# Patient Record
Sex: MALE | Race: WHITE | NOT HISPANIC OR LATINO | URBAN - METROPOLITAN AREA
[De-identification: names, ages, dates, MRNs, and addresses within clinical notes are randomized per-mention and may not be internally consistent; named-entity substitution may affect disease eponyms.]

---

## 2020-05-29 ENCOUNTER — OFFICE VISIT (OUTPATIENT)
Dept: URBAN - METROPOLITAN AREA CLINIC 76 | Facility: CLINIC | Age: 75
End: 2020-05-29
Payer: MEDICARE

## 2020-05-29 DIAGNOSIS — H52.4 PRESBYOPIA: ICD-10-CM

## 2020-05-29 DIAGNOSIS — H25.13 AGE-RELATED NUCLEAR CATARACT, BILATERAL: Primary | Chronic | ICD-10-CM

## 2020-05-29 PROCEDURE — 92014 COMPRE OPH EXAM EST PT 1/>: CPT | Performed by: OPTOMETRIST

## 2020-05-29 ASSESSMENT — KERATOMETRY
OS: 43.75
OD: 43.63

## 2020-05-29 ASSESSMENT — INTRAOCULAR PRESSURE
OS: 8
OD: 8

## 2020-05-29 ASSESSMENT — VISUAL ACUITY
OS: 20/30
OD: 20/40

## 2020-05-29 NOTE — IMPRESSION/PLAN
Impression: Age-related nuclear cataract, bilateral: H25.13. Bilateral. Vision: vision affected. Plan: Cataracts account for the patient's complaints. Patient understands changing glasses will not improve vision. Recommend carla rodrigues/ Dr. Katie Dimas for cataract surgery.

## 2020-07-17 ENCOUNTER — HOSPITAL ENCOUNTER (EMERGENCY)
Dept: HOSPITAL 41 - JD.ED | Age: 75
Discharge: HOME | End: 2020-07-17
Payer: MEDICARE

## 2020-07-17 DIAGNOSIS — F32.9: ICD-10-CM

## 2020-07-17 DIAGNOSIS — Z86.73: ICD-10-CM

## 2020-07-17 DIAGNOSIS — Z87.891: ICD-10-CM

## 2020-07-17 DIAGNOSIS — I25.2: ICD-10-CM

## 2020-07-17 DIAGNOSIS — R56.9: ICD-10-CM

## 2020-07-17 DIAGNOSIS — F03.90: ICD-10-CM

## 2020-07-17 DIAGNOSIS — M54.9: ICD-10-CM

## 2020-07-17 DIAGNOSIS — I10: ICD-10-CM

## 2020-07-17 DIAGNOSIS — Z88.0: ICD-10-CM

## 2020-07-17 DIAGNOSIS — E78.00: ICD-10-CM

## 2020-07-17 DIAGNOSIS — G89.29: ICD-10-CM

## 2020-07-17 DIAGNOSIS — I82.621: Primary | ICD-10-CM

## 2020-07-17 PROCEDURE — 99284 EMERGENCY DEPT VISIT MOD MDM: CPT

## 2020-07-17 PROCEDURE — 80053 COMPREHEN METABOLIC PANEL: CPT

## 2020-07-17 PROCEDURE — 93971 EXTREMITY STUDY: CPT

## 2020-07-17 PROCEDURE — 36415 COLL VENOUS BLD VENIPUNCTURE: CPT

## 2020-07-17 PROCEDURE — 85025 COMPLETE CBC W/AUTO DIFF WBC: CPT

## 2020-08-26 ENCOUNTER — HOSPITAL ENCOUNTER (EMERGENCY)
Dept: HOSPITAL 41 - JD.ED | Age: 75
Discharge: HOME | End: 2020-08-26
Payer: MEDICARE

## 2020-08-26 DIAGNOSIS — Z86.73: ICD-10-CM

## 2020-08-26 DIAGNOSIS — I25.2: ICD-10-CM

## 2020-08-26 DIAGNOSIS — F32.9: ICD-10-CM

## 2020-08-26 DIAGNOSIS — I10: ICD-10-CM

## 2020-08-26 DIAGNOSIS — R51: Primary | ICD-10-CM

## 2020-08-26 DIAGNOSIS — Z79.01: ICD-10-CM

## 2020-08-26 DIAGNOSIS — Z88.0: ICD-10-CM

## 2020-08-26 DIAGNOSIS — Z20.828: ICD-10-CM

## 2020-08-26 DIAGNOSIS — Z87.891: ICD-10-CM

## 2020-08-26 DIAGNOSIS — F03.90: ICD-10-CM

## 2020-08-26 DIAGNOSIS — E78.00: ICD-10-CM

## 2020-08-26 DIAGNOSIS — Z79.899: ICD-10-CM

## 2020-08-26 PROCEDURE — 99284 EMERGENCY DEPT VISIT MOD MDM: CPT

## 2020-08-26 PROCEDURE — 36415 COLL VENOUS BLD VENIPUNCTURE: CPT

## 2020-08-26 PROCEDURE — 80053 COMPREHEN METABOLIC PANEL: CPT

## 2020-08-26 PROCEDURE — U0002 COVID-19 LAB TEST NON-CDC: HCPCS

## 2020-08-26 PROCEDURE — 85025 COMPLETE CBC W/AUTO DIFF WBC: CPT

## 2020-08-26 PROCEDURE — 86140 C-REACTIVE PROTEIN: CPT

## 2020-08-26 PROCEDURE — 70450 CT HEAD/BRAIN W/O DYE: CPT

## 2020-10-07 ENCOUNTER — HOSPITAL ENCOUNTER (EMERGENCY)
Dept: HOSPITAL 41 - JD.ED | Age: 75
Discharge: HOME | End: 2020-10-07
Payer: MEDICARE

## 2020-10-07 DIAGNOSIS — Z79.01: ICD-10-CM

## 2020-10-07 DIAGNOSIS — E78.00: ICD-10-CM

## 2020-10-07 DIAGNOSIS — F32.9: ICD-10-CM

## 2020-10-07 DIAGNOSIS — N40.0: ICD-10-CM

## 2020-10-07 DIAGNOSIS — I25.2: ICD-10-CM

## 2020-10-07 DIAGNOSIS — F02.80: ICD-10-CM

## 2020-10-07 DIAGNOSIS — I10: ICD-10-CM

## 2020-10-07 DIAGNOSIS — Z79.899: ICD-10-CM

## 2020-10-07 DIAGNOSIS — G40.909: Primary | ICD-10-CM

## 2020-10-07 DIAGNOSIS — Z86.73: ICD-10-CM

## 2020-10-07 DIAGNOSIS — G30.9: ICD-10-CM

## 2020-10-07 DIAGNOSIS — Z88.0: ICD-10-CM

## 2020-10-07 PROCEDURE — 83880 ASSAY OF NATRIURETIC PEPTIDE: CPT

## 2020-10-07 PROCEDURE — 93005 ELECTROCARDIOGRAM TRACING: CPT

## 2020-10-07 PROCEDURE — 85025 COMPLETE CBC W/AUTO DIFF WBC: CPT

## 2020-10-07 PROCEDURE — 83605 ASSAY OF LACTIC ACID: CPT

## 2020-10-07 PROCEDURE — 96365 THER/PROPH/DIAG IV INF INIT: CPT

## 2020-10-07 PROCEDURE — 80053 COMPREHEN METABOLIC PANEL: CPT

## 2020-10-07 PROCEDURE — 85610 PROTHROMBIN TIME: CPT

## 2020-10-07 PROCEDURE — 80307 DRUG TEST PRSMV CHEM ANLYZR: CPT

## 2020-10-07 PROCEDURE — 83735 ASSAY OF MAGNESIUM: CPT

## 2020-10-07 PROCEDURE — 84484 ASSAY OF TROPONIN QUANT: CPT

## 2020-10-07 PROCEDURE — 85730 THROMBOPLASTIN TIME PARTIAL: CPT

## 2020-10-07 PROCEDURE — 86140 C-REACTIVE PROTEIN: CPT

## 2020-10-07 PROCEDURE — 36415 COLL VENOUS BLD VENIPUNCTURE: CPT

## 2020-10-07 PROCEDURE — 99285 EMERGENCY DEPT VISIT HI MDM: CPT

## 2020-10-07 PROCEDURE — 70450 CT HEAD/BRAIN W/O DYE: CPT

## 2021-03-07 ENCOUNTER — HOSPITAL ENCOUNTER (EMERGENCY)
Dept: HOSPITAL 41 - JD.ED | Age: 76
Discharge: HOME | End: 2021-03-07
Payer: MEDICARE

## 2021-03-07 DIAGNOSIS — F02.80: ICD-10-CM

## 2021-03-07 DIAGNOSIS — Z79.899: ICD-10-CM

## 2021-03-07 DIAGNOSIS — R56.9: ICD-10-CM

## 2021-03-07 DIAGNOSIS — Z88.0: ICD-10-CM

## 2021-03-07 DIAGNOSIS — N40.0: ICD-10-CM

## 2021-03-07 DIAGNOSIS — Z72.0: ICD-10-CM

## 2021-03-07 DIAGNOSIS — G30.9: ICD-10-CM

## 2021-03-07 DIAGNOSIS — M70.22: Primary | ICD-10-CM

## 2021-03-07 DIAGNOSIS — E78.00: ICD-10-CM

## 2021-03-07 DIAGNOSIS — I25.2: ICD-10-CM

## 2021-03-07 DIAGNOSIS — Z86.73: ICD-10-CM

## 2021-03-07 DIAGNOSIS — Z79.01: ICD-10-CM

## 2021-03-07 DIAGNOSIS — I10: ICD-10-CM

## 2021-03-07 PROCEDURE — 99283 EMERGENCY DEPT VISIT LOW MDM: CPT

## 2021-03-07 PROCEDURE — 20605 DRAIN/INJ JOINT/BURSA W/O US: CPT

## 2021-07-18 ENCOUNTER — HOSPITAL ENCOUNTER (EMERGENCY)
Dept: HOSPITAL 41 - JD.ED | Age: 76
Discharge: HOME | End: 2021-07-18
Payer: MEDICARE

## 2021-07-18 DIAGNOSIS — I25.2: ICD-10-CM

## 2021-07-18 DIAGNOSIS — Z87.891: ICD-10-CM

## 2021-07-18 DIAGNOSIS — E78.00: ICD-10-CM

## 2021-07-18 DIAGNOSIS — Z79.899: ICD-10-CM

## 2021-07-18 DIAGNOSIS — I45.10: ICD-10-CM

## 2021-07-18 DIAGNOSIS — N40.0: ICD-10-CM

## 2021-07-18 DIAGNOSIS — R07.89: Primary | ICD-10-CM

## 2021-07-18 DIAGNOSIS — Z88.0: ICD-10-CM

## 2021-07-18 DIAGNOSIS — G30.9: ICD-10-CM

## 2021-07-18 DIAGNOSIS — F02.80: ICD-10-CM

## 2021-07-18 DIAGNOSIS — Z79.01: ICD-10-CM

## 2021-07-18 DIAGNOSIS — I10: ICD-10-CM

## 2021-07-18 PROCEDURE — 93005 ELECTROCARDIOGRAM TRACING: CPT

## 2021-07-18 PROCEDURE — 71045 X-RAY EXAM CHEST 1 VIEW: CPT

## 2021-07-18 PROCEDURE — 84484 ASSAY OF TROPONIN QUANT: CPT

## 2021-07-18 PROCEDURE — 80053 COMPREHEN METABOLIC PANEL: CPT

## 2021-07-18 PROCEDURE — 85025 COMPLETE CBC W/AUTO DIFF WBC: CPT

## 2021-07-18 PROCEDURE — 36415 COLL VENOUS BLD VENIPUNCTURE: CPT

## 2021-07-18 PROCEDURE — 99285 EMERGENCY DEPT VISIT HI MDM: CPT

## 2021-07-18 PROCEDURE — 85379 FIBRIN DEGRADATION QUANT: CPT

## 2021-08-16 ENCOUNTER — HOSPITAL ENCOUNTER (EMERGENCY)
Dept: HOSPITAL 41 - JD.ED | Age: 76
Discharge: HOME | End: 2021-08-16
Payer: MEDICARE

## 2021-08-16 DIAGNOSIS — Z79.01: ICD-10-CM

## 2021-08-16 DIAGNOSIS — M70.22: Primary | ICD-10-CM

## 2021-08-16 DIAGNOSIS — E78.00: ICD-10-CM

## 2021-08-16 DIAGNOSIS — Z79.899: ICD-10-CM

## 2021-08-16 DIAGNOSIS — I10: ICD-10-CM

## 2021-08-16 DIAGNOSIS — L08.9: ICD-10-CM

## 2021-08-16 DIAGNOSIS — I25.2: ICD-10-CM

## 2021-08-16 DIAGNOSIS — E66.9: ICD-10-CM

## 2021-08-16 DIAGNOSIS — Z86.73: ICD-10-CM

## 2021-08-16 PROCEDURE — 99283 EMERGENCY DEPT VISIT LOW MDM: CPT

## 2021-08-16 PROCEDURE — 20605 DRAIN/INJ JOINT/BURSA W/O US: CPT

## 2021-10-20 ENCOUNTER — HOSPITAL ENCOUNTER (EMERGENCY)
Dept: HOSPITAL 41 - JD.ED | Age: 76
Discharge: HOME | End: 2021-10-20
Payer: MEDICARE

## 2021-10-20 DIAGNOSIS — Z88.0: ICD-10-CM

## 2021-10-20 DIAGNOSIS — E78.00: ICD-10-CM

## 2021-10-20 DIAGNOSIS — E66.9: ICD-10-CM

## 2021-10-20 DIAGNOSIS — I25.2: ICD-10-CM

## 2021-10-20 DIAGNOSIS — Z79.899: ICD-10-CM

## 2021-10-20 DIAGNOSIS — G30.9: ICD-10-CM
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
## 2021-10-20 DIAGNOSIS — I10: ICD-10-CM

## 2021-10-20 DIAGNOSIS — R55: Primary | ICD-10-CM

## 2021-10-20 DIAGNOSIS — N40.0: ICD-10-CM

## 2021-10-20 DIAGNOSIS — Z79.01: ICD-10-CM

## 2021-10-20 DIAGNOSIS — F02.80: ICD-10-CM

## 2022-10-11 ENCOUNTER — HOSPITAL ENCOUNTER (EMERGENCY)
Dept: HOSPITAL 41 - JD.ED | Age: 77
Discharge: HOME | End: 2022-10-11
Payer: MEDICARE

## 2022-10-11 DIAGNOSIS — I10: ICD-10-CM

## 2022-10-11 DIAGNOSIS — Z88.0: ICD-10-CM

## 2022-10-11 DIAGNOSIS — Z20.822: ICD-10-CM

## 2022-10-11 DIAGNOSIS — E78.00: ICD-10-CM

## 2022-10-11 DIAGNOSIS — E66.9: ICD-10-CM

## 2022-10-11 DIAGNOSIS — R56.9: Primary | ICD-10-CM

## 2022-10-11 DIAGNOSIS — Z79.899: ICD-10-CM

## 2022-10-11 DIAGNOSIS — I25.2: ICD-10-CM

## 2022-10-11 LAB
EGFRCR SERPLBLD CKD-EPI 2021: 53 ML/MIN (ref 60–?)
SARS-COV-2 RNA RESP QL NAA+PROBE: NEGATIVE

## 2022-10-11 PROCEDURE — 70450 CT HEAD/BRAIN W/O DYE: CPT

## 2022-10-11 PROCEDURE — 81001 URINALYSIS AUTO W/SCOPE: CPT

## 2022-10-11 PROCEDURE — 85027 COMPLETE CBC AUTOMATED: CPT

## 2022-10-11 PROCEDURE — 85007 BL SMEAR W/DIFF WBC COUNT: CPT

## 2022-10-11 PROCEDURE — 83735 ASSAY OF MAGNESIUM: CPT

## 2022-10-11 PROCEDURE — 0240U: CPT

## 2022-10-11 PROCEDURE — 80053 COMPREHEN METABOLIC PANEL: CPT

## 2022-10-11 PROCEDURE — 80177 DRUG SCRN QUAN LEVETIRACETAM: CPT

## 2022-10-11 PROCEDURE — 71045 X-RAY EXAM CHEST 1 VIEW: CPT

## 2022-10-11 PROCEDURE — 99284 EMERGENCY DEPT VISIT MOD MDM: CPT

## 2022-10-11 PROCEDURE — 86140 C-REACTIVE PROTEIN: CPT

## 2022-10-11 PROCEDURE — 36415 COLL VENOUS BLD VENIPUNCTURE: CPT

## 2022-10-31 ENCOUNTER — HOSPITAL ENCOUNTER (EMERGENCY)
Dept: HOSPITAL 41 - JD.ED | Age: 77
Discharge: NURSING, REHAB OR LONG-TERM CARE | End: 2022-10-31
Payer: MEDICARE

## 2022-10-31 DIAGNOSIS — U07.1: ICD-10-CM

## 2022-10-31 DIAGNOSIS — E66.9: ICD-10-CM

## 2022-10-31 DIAGNOSIS — E78.00: ICD-10-CM

## 2022-10-31 DIAGNOSIS — I45.10: ICD-10-CM

## 2022-10-31 DIAGNOSIS — Z88.0: ICD-10-CM

## 2022-10-31 DIAGNOSIS — G40.901: Primary | ICD-10-CM

## 2022-10-31 DIAGNOSIS — F02.80: ICD-10-CM

## 2022-10-31 DIAGNOSIS — Z79.01: ICD-10-CM

## 2022-10-31 DIAGNOSIS — Z79.899: ICD-10-CM

## 2022-10-31 DIAGNOSIS — I25.2: ICD-10-CM

## 2022-10-31 DIAGNOSIS — G30.9: ICD-10-CM

## 2022-10-31 DIAGNOSIS — I44.0: ICD-10-CM

## 2022-10-31 DIAGNOSIS — Z79.82: ICD-10-CM

## 2022-10-31 DIAGNOSIS — I10: ICD-10-CM

## 2022-10-31 LAB — EGFRCR SERPLBLD CKD-EPI 2021: 42 ML/MIN (ref 60–?)

## 2022-10-31 PROCEDURE — 80307 DRUG TEST PRSMV CHEM ANLYZR: CPT

## 2022-10-31 PROCEDURE — 80177 DRUG SCRN QUAN LEVETIRACETAM: CPT

## 2022-10-31 PROCEDURE — 85610 PROTHROMBIN TIME: CPT

## 2022-10-31 PROCEDURE — 51701 INSERT BLADDER CATHETER: CPT

## 2022-10-31 PROCEDURE — 71045 X-RAY EXAM CHEST 1 VIEW: CPT

## 2022-10-31 PROCEDURE — 36415 COLL VENOUS BLD VENIPUNCTURE: CPT

## 2022-10-31 PROCEDURE — 80053 COMPREHEN METABOLIC PANEL: CPT

## 2022-10-31 PROCEDURE — 96365 THER/PROPH/DIAG IV INF INIT: CPT

## 2022-10-31 PROCEDURE — 84484 ASSAY OF TROPONIN QUANT: CPT

## 2022-10-31 PROCEDURE — 99285 EMERGENCY DEPT VISIT HI MDM: CPT

## 2022-10-31 PROCEDURE — 81001 URINALYSIS AUTO W/SCOPE: CPT

## 2022-10-31 PROCEDURE — 85730 THROMBOPLASTIN TIME PARTIAL: CPT

## 2022-10-31 PROCEDURE — 83735 ASSAY OF MAGNESIUM: CPT

## 2022-10-31 PROCEDURE — 70450 CT HEAD/BRAIN W/O DYE: CPT

## 2022-10-31 PROCEDURE — U0002 COVID-19 LAB TEST NON-CDC: HCPCS

## 2022-10-31 PROCEDURE — 85025 COMPLETE CBC W/AUTO DIFF WBC: CPT

## 2022-10-31 PROCEDURE — 93005 ELECTROCARDIOGRAM TRACING: CPT

## 2023-03-04 ENCOUNTER — HOSPITAL ENCOUNTER (INPATIENT)
Dept: HOSPITAL 41 - JD.ED | Age: 78
LOS: 12 days | Discharge: HOME | DRG: 872 | End: 2023-03-16
Attending: STUDENT IN AN ORGANIZED HEALTH CARE EDUCATION/TRAINING PROGRAM | Admitting: INTERNAL MEDICINE
Payer: MEDICARE

## 2023-03-04 DIAGNOSIS — Z95.5: ICD-10-CM

## 2023-03-04 DIAGNOSIS — F17.200: ICD-10-CM

## 2023-03-04 DIAGNOSIS — Z86.16: ICD-10-CM

## 2023-03-04 DIAGNOSIS — I95.9: ICD-10-CM

## 2023-03-04 DIAGNOSIS — T46.3X5A: ICD-10-CM

## 2023-03-04 DIAGNOSIS — F32.A: ICD-10-CM

## 2023-03-04 DIAGNOSIS — E66.9: ICD-10-CM

## 2023-03-04 DIAGNOSIS — Z79.01: ICD-10-CM

## 2023-03-04 DIAGNOSIS — G89.29: ICD-10-CM

## 2023-03-04 DIAGNOSIS — I25.2: ICD-10-CM

## 2023-03-04 DIAGNOSIS — A41.51: Primary | ICD-10-CM

## 2023-03-04 DIAGNOSIS — E78.00: ICD-10-CM

## 2023-03-04 DIAGNOSIS — N30.01: ICD-10-CM

## 2023-03-04 DIAGNOSIS — Z79.51: ICD-10-CM

## 2023-03-04 DIAGNOSIS — I25.10: ICD-10-CM

## 2023-03-04 DIAGNOSIS — Z20.822: ICD-10-CM

## 2023-03-04 DIAGNOSIS — F02.A11: ICD-10-CM

## 2023-03-04 DIAGNOSIS — D69.6: ICD-10-CM

## 2023-03-04 DIAGNOSIS — Z86.73: ICD-10-CM

## 2023-03-04 DIAGNOSIS — M54.9: ICD-10-CM

## 2023-03-04 DIAGNOSIS — R41.82: ICD-10-CM

## 2023-03-04 DIAGNOSIS — E78.5: ICD-10-CM

## 2023-03-04 DIAGNOSIS — T48.1X5A: ICD-10-CM

## 2023-03-04 DIAGNOSIS — R62.7: ICD-10-CM

## 2023-03-04 DIAGNOSIS — Z79.899: ICD-10-CM

## 2023-03-04 DIAGNOSIS — I10: ICD-10-CM

## 2023-03-04 DIAGNOSIS — G40.909: ICD-10-CM

## 2023-03-04 DIAGNOSIS — Z88.0: ICD-10-CM

## 2023-03-04 DIAGNOSIS — Z79.82: ICD-10-CM

## 2023-03-04 DIAGNOSIS — G30.9: ICD-10-CM

## 2023-03-04 DIAGNOSIS — N40.0: ICD-10-CM

## 2023-03-04 LAB
EGFRCR SERPLBLD CKD-EPI 2021: 42 ML/MIN (ref 60–?)
SARS-COV-2 RNA RESP QL NAA+PROBE: NEGATIVE

## 2023-03-05 RX ADMIN — DIVALPROEX SODIUM SCH MG: 250 TABLET, DELAYED RELEASE ORAL at 22:21

## 2023-03-05 RX ADMIN — DIVALPROEX SODIUM SCH MG: 250 TABLET, DELAYED RELEASE ORAL at 17:16

## 2023-03-06 LAB — EGFRCR SERPLBLD CKD-EPI 2021: 42 ML/MIN (ref 60–?)

## 2023-03-06 RX ADMIN — DIVALPROEX SODIUM SCH MG: 250 TABLET, DELAYED RELEASE ORAL at 08:15

## 2023-03-06 RX ADMIN — DIVALPROEX SODIUM SCH MG: 250 TABLET, DELAYED RELEASE ORAL at 20:46

## 2023-03-07 LAB — EGFRCR SERPLBLD CKD-EPI 2021: 48 ML/MIN (ref 60–?)

## 2023-03-07 RX ADMIN — Medication SCH: at 08:54

## 2023-03-07 RX ADMIN — DIVALPROEX SODIUM SCH MG: 250 TABLET, DELAYED RELEASE ORAL at 20:19

## 2023-03-07 RX ADMIN — DIVALPROEX SODIUM SCH MG: 250 TABLET, DELAYED RELEASE ORAL at 09:05

## 2023-03-07 RX ADMIN — Medication SCH: at 09:40

## 2023-03-08 LAB — EGFRCR SERPLBLD CKD-EPI 2021: 52 ML/MIN (ref 60–?)

## 2023-03-08 RX ADMIN — DIVALPROEX SODIUM SCH MG: 250 TABLET, DELAYED RELEASE ORAL at 08:56

## 2023-03-08 RX ADMIN — DIVALPROEX SODIUM SCH MG: 250 TABLET, DELAYED RELEASE ORAL at 19:47

## 2023-03-08 RX ADMIN — DIVALPROEX SODIUM SCH: 250 TABLET, DELAYED RELEASE ORAL at 22:04

## 2023-03-08 RX ADMIN — Medication SCH: at 08:57

## 2023-03-09 LAB — EGFRCR SERPLBLD CKD-EPI 2021: 63 ML/MIN (ref 60–?)

## 2023-03-09 RX ADMIN — DIVALPROEX SODIUM SCH MG: 250 TABLET, DELAYED RELEASE ORAL at 07:47

## 2023-03-09 RX ADMIN — DIVALPROEX SODIUM SCH MG: 250 TABLET, DELAYED RELEASE ORAL at 20:48

## 2023-03-09 RX ADMIN — DIVALPROEX SODIUM SCH: 250 TABLET, DELAYED RELEASE ORAL at 08:11

## 2023-03-09 RX ADMIN — Medication SCH: at 08:13

## 2023-03-10 LAB — EGFRCR SERPLBLD CKD-EPI 2021: 70 ML/MIN (ref 60–?)

## 2023-03-10 RX ADMIN — DIVALPROEX SODIUM SCH MG: 250 TABLET, DELAYED RELEASE ORAL at 08:42

## 2023-03-10 RX ADMIN — Medication SCH: at 08:53

## 2023-03-10 RX ADMIN — DIVALPROEX SODIUM SCH MG: 250 TABLET, DELAYED RELEASE ORAL at 20:09

## 2023-03-11 RX ADMIN — DIVALPROEX SODIUM SCH MG: 250 TABLET, DELAYED RELEASE ORAL at 09:34

## 2023-03-11 RX ADMIN — Medication SCH: at 09:36

## 2023-03-11 RX ADMIN — DIVALPROEX SODIUM SCH MG: 250 TABLET, DELAYED RELEASE ORAL at 20:12

## 2023-03-12 RX ADMIN — Medication SCH: at 09:52

## 2023-03-12 RX ADMIN — DIVALPROEX SODIUM SCH MG: 250 TABLET, DELAYED RELEASE ORAL at 09:49

## 2023-03-12 RX ADMIN — DIVALPROEX SODIUM SCH MG: 250 TABLET, DELAYED RELEASE ORAL at 19:55

## 2023-03-12 RX ADMIN — DIVALPROEX SODIUM SCH: 250 TABLET, DELAYED RELEASE ORAL at 22:02

## 2023-03-13 RX ADMIN — DIVALPROEX SODIUM SCH MG: 250 TABLET, DELAYED RELEASE ORAL at 09:57

## 2023-03-13 RX ADMIN — Medication SCH: at 16:00

## 2023-03-13 RX ADMIN — DIVALPROEX SODIUM SCH MG: 250 TABLET, DELAYED RELEASE ORAL at 20:13

## 2023-03-14 RX ADMIN — DIVALPROEX SODIUM SCH MG: 250 TABLET, DELAYED RELEASE ORAL at 08:28

## 2023-03-14 RX ADMIN — DIVALPROEX SODIUM SCH MG: 250 TABLET, DELAYED RELEASE ORAL at 20:40

## 2023-03-14 RX ADMIN — Medication SCH: at 08:37

## 2023-03-15 RX ADMIN — DIVALPROEX SODIUM SCH MG: 250 TABLET, DELAYED RELEASE ORAL at 21:52

## 2023-03-15 RX ADMIN — Medication SCH: at 10:27

## 2023-03-15 RX ADMIN — DIVALPROEX SODIUM SCH MG: 250 TABLET, DELAYED RELEASE ORAL at 10:26

## 2023-03-16 RX ADMIN — Medication SCH: at 09:00

## 2023-03-16 RX ADMIN — DIVALPROEX SODIUM SCH MG: 250 TABLET, DELAYED RELEASE ORAL at 07:56

## 2023-11-29 ENCOUNTER — HOSPITAL ENCOUNTER (EMERGENCY)
Dept: HOSPITAL 41 - JD.ED | Age: 78
Discharge: HOME | End: 2023-11-29
Payer: MEDICARE

## 2023-11-29 DIAGNOSIS — I25.2: ICD-10-CM

## 2023-11-29 DIAGNOSIS — I25.10: ICD-10-CM

## 2023-11-29 DIAGNOSIS — E78.00: ICD-10-CM

## 2023-11-29 DIAGNOSIS — G30.9: ICD-10-CM

## 2023-11-29 DIAGNOSIS — Z79.82: ICD-10-CM

## 2023-11-29 DIAGNOSIS — R56.9: Primary | ICD-10-CM

## 2023-11-29 DIAGNOSIS — Z79.899: ICD-10-CM

## 2023-11-29 DIAGNOSIS — Z86.73: ICD-10-CM

## 2023-11-29 DIAGNOSIS — E66.9: ICD-10-CM

## 2023-11-29 DIAGNOSIS — I10: ICD-10-CM

## 2023-11-29 DIAGNOSIS — Z88.0: ICD-10-CM

## 2023-11-29 DIAGNOSIS — Z20.822: ICD-10-CM

## 2023-11-29 DIAGNOSIS — Z86.16: ICD-10-CM

## 2023-11-29 DIAGNOSIS — F02.82: ICD-10-CM

## 2023-11-29 LAB
ALBUMIN SERPL-MCNC: 3.1 G/DL (ref 3.4–5)
ALBUMIN/GLOB SERPL: 1 {RATIO} (ref 1–2)
ALP SERPL-CCNC: 56 U/L (ref 46–116)
ALT SERPL-CCNC: 14 U/L (ref 16–63)
ANION GAP SERPL CALC-SCNC: 12.9 MMOL/L (ref 5–15)
APPEARANCE UR: CLEAR
AST SERPL-CCNC: 11 U/L (ref 15–37)
BACTERIA URNS QL MICRO: (no result) /HPF
BASOPHILS # BLD AUTO: 0 K/MM3 (ref 0–0.2)
BASOPHILS NFR BLD AUTO: 0.8 % (ref 0–1)
BILIRUB SERPL-MCNC: 0.7 MG/DL (ref 0.2–1)
BILIRUB UR STRIP-MCNC: (no result) MG/DL
BUN SERPL-MCNC: 27 MG/DL (ref 7–18)
BUN/CREAT SERPL: 13.5 (ref 14–18)
CALCIUM SERPL-MCNC: 9 MG/DL (ref 8.5–10.1)
CHLORIDE SERPL-SCNC: 106 MEQ/L (ref 98–107)
CO2 SERPL-SCNC: 28 MEQ/L (ref 21–32)
COLOR UR: (no result)
CREAT CL 24H UR+SERPL-VRATE: 32.44 ML/MIN
CREAT SERPL-MCNC: 2 MG/DL (ref 0.7–1.3)
EGFRCR SERPLBLD CKD-EPI 2021: 34 ML/MIN (ref 60–?)
EOSINOPHIL # BLD AUTO: 0.1 K/MM3 (ref 0–0.4)
EOSINOPHIL NFR BLD AUTO: 1.1 % (ref 0–6)
ETHANOL BLD-MCNC: 0 GM%
FLUAV RNA UPPER RESP QL NAA+PROBE: NEGATIVE
GLOBULIN SER-MCNC: 3 GM/DL
GLUCOSE SERPL-MCNC: 130 MG/DL (ref 70–99)
GLUCOSE UR STRIP-MCNC: NEGATIVE MG/DL
HCT VFR BLD AUTO: 42.3 % (ref 42–52)
HGB BLD-MCNC: 13.8 GM/DL (ref 14–18)
IMM GRANULOCYTES # BLD: 0.01 K/MM3 (ref 0–0.05)
IMM GRANULOCYTES NFR BLD: 0.2 % (ref 0–0.4)
INR PPP: 1.11
KETONES UR STRIP-MCNC: (no result) MG/DL
LYMPHOCYTES # BLD AUTO: 1.2 K/MM3 (ref 1–4.8)
LYMPHOCYTES NFR BLD AUTO: 23.1 % (ref 24–44)
MCH RBC QN AUTO: 29.5 PG (ref 28–32)
MCHC RBC AUTO-ENTMCNC: 32.6 G/DL (ref 32–36)
MCHC RBC AUTO-ENTMCNC: 90.4 FL (ref 83–99)
MONOCYTES # BLD AUTO: 0.6 K/MM3 (ref 0–0.8)
MONOCYTES NFR BLD AUTO: 10.7 % (ref 0–8)
MUCOUS THREADS URNS QL MICRO: (no result) /HPF
NEUTROPHILS # BLD AUTO: 3.4 K/MM3 (ref 1.8–7.7)
NEUTROPHILS NFR BLD AUTO: 64.1 % (ref 41–71)
NITRITE UR QL: NEGATIVE
NRBC BLD AUTO-RTO: 0 % (ref 0–0.02)
NRBC BLD AUTO-RTO: 0 % (ref 0–0.2)
PH UR STRIP: 5.5 [PH] (ref 5–8)
PLATELET # BLD AUTO: 125 K/MM3 (ref 150–400)
PMV BLD AUTO: 9 FL (ref 9.4–12.4)
POTASSIUM SERPL-SCNC: 3.9 MEQ/L (ref 3.5–5.1)
PROT SERPL-MCNC: 6.1 G/DL (ref 6.4–8.2)
PROT UR STRIP-MCNC: (no result) MG/DL
PROTHROMBIN TIME: 11.8 SECONDS (ref 9.7–12)
RBC # BLD AUTO: 4.68 M/MM3 (ref 4.52–5.9)
RBC # URNS HPF: (no result) /HPF (ref 0–5)
RBC UR QL: (no result)
RSV RNA UPPER RESP QL NAA+PROBE: NEGATIVE
SARS-COV-2 RNA RESP QL NAA+PROBE: NEGATIVE
SODIUM SERPL-SCNC: 143 MEQ/L (ref 136–145)
SP GR UR STRIP: 1.02 (ref 1–1.03)
SQUAMOUS #/AREA URNS HPF: (no result) /HPF (ref 0–5)
TSH SERPL DL<=0.005 MIU/L-ACNC: 1.55 UIU/ML (ref 0.36–3.74)
UROBILINOGEN UR STRIP-ACNC: 1 (ref 0.2–1)
WBC # BLD AUTO: 5.33 K/MM3 (ref 3.9–11.3)
WBC UR QL: (no result) /HPF (ref 0–5)

## 2023-11-29 PROCEDURE — C1758 CATHETER, URETERAL: HCPCS

## 2023-11-29 PROCEDURE — 0241U: CPT

## 2023-11-29 PROCEDURE — 96360 HYDRATION IV INFUSION INIT: CPT

## 2023-11-29 PROCEDURE — 80053 COMPREHEN METABOLIC PANEL: CPT

## 2023-11-29 PROCEDURE — 85610 PROTHROMBIN TIME: CPT

## 2023-11-29 PROCEDURE — 96361 HYDRATE IV INFUSION ADD-ON: CPT

## 2023-11-29 PROCEDURE — 70450 CT HEAD/BRAIN W/O DYE: CPT

## 2023-11-29 PROCEDURE — 36415 COLL VENOUS BLD VENIPUNCTURE: CPT

## 2023-11-29 PROCEDURE — 80307 DRUG TEST PRSMV CHEM ANLYZR: CPT

## 2023-11-29 PROCEDURE — 85025 COMPLETE CBC W/AUTO DIFF WBC: CPT

## 2023-11-29 PROCEDURE — 99285 EMERGENCY DEPT VISIT HI MDM: CPT

## 2023-11-29 PROCEDURE — 84443 ASSAY THYROID STIM HORMONE: CPT

## 2023-11-29 PROCEDURE — 82140 ASSAY OF AMMONIA: CPT

## 2023-11-29 PROCEDURE — 81001 URINALYSIS AUTO W/SCOPE: CPT

## 2024-04-26 ENCOUNTER — HOSPITAL ENCOUNTER (EMERGENCY)
Dept: HOSPITAL 41 - JD.ED | Age: 79
Discharge: NURSING, REHAB OR LONG-TERM CARE | End: 2024-04-26
Payer: MEDICARE

## 2024-04-26 DIAGNOSIS — Z88.0: ICD-10-CM

## 2024-04-26 DIAGNOSIS — Z86.73: ICD-10-CM

## 2024-04-26 DIAGNOSIS — Z79.82: ICD-10-CM

## 2024-04-26 DIAGNOSIS — E78.00: ICD-10-CM

## 2024-04-26 DIAGNOSIS — I25.2: ICD-10-CM

## 2024-04-26 DIAGNOSIS — I25.10: ICD-10-CM

## 2024-04-26 DIAGNOSIS — R00.1: ICD-10-CM

## 2024-04-26 DIAGNOSIS — Z79.899: ICD-10-CM
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
## 2024-04-26 DIAGNOSIS — Z86.16: ICD-10-CM

## 2024-04-26 DIAGNOSIS — I10: ICD-10-CM

## 2024-04-26 DIAGNOSIS — G30.1: Primary | ICD-10-CM

## 2024-04-26 LAB
ALBUMIN SERPL-MCNC: 2.8 G/DL (ref 3.4–5)
ALBUMIN/GLOB SERPL: 0.9 {RATIO} (ref 1–2)
ALP SERPL-CCNC: 57 U/L (ref 46–116)
ALT SERPL-CCNC: 12 U/L (ref 16–63)
ANION GAP SERPL CALC-SCNC: 10.7 MMOL/L (ref 5–15)
AST SERPL-CCNC: 5 U/L (ref 15–37)
BASOPHILS # BLD AUTO: 0 K/MM3 (ref 0–0.2)
BASOPHILS NFR BLD AUTO: 0.6 % (ref 0–1)
BILIRUB SERPL-MCNC: 0.4 MG/DL (ref 0.2–1)
BUN SERPL-MCNC: 31 MG/DL (ref 7–18)
BUN/CREAT SERPL: 14.8 (ref 14–18)
CALCIUM SERPL-MCNC: 8.7 MG/DL (ref 8.5–10.1)
CHLORIDE SERPL-SCNC: 107 MEQ/L (ref 98–107)
CO2 SERPL-SCNC: 29 MEQ/L (ref 21–32)
CREAT CL 24H UR+SERPL-VRATE: 32.85 ML/MIN
CREAT SERPL-MCNC: 2.1 MG/DL (ref 0.7–1.3)
EGFRCR SERPLBLD CKD-EPI 2021: 32 ML/MIN (ref 60–?)
EOSINOPHIL # BLD AUTO: 0.1 K/MM3 (ref 0–0.4)
EOSINOPHIL NFR BLD AUTO: 2.5 % (ref 0–6)
GLOBULIN SER-MCNC: 3 GM/DL
GLUCOSE SERPL-MCNC: 108 MG/DL (ref 70–99)
HCT VFR BLD AUTO: 37.1 % (ref 42–52)
HGB BLD-MCNC: 11.8 GM/DL (ref 14–18)
IMM GRANULOCYTES # BLD: 0.01 K/MM3 (ref 0–0.05)
IMM GRANULOCYTES NFR BLD: 0.2 % (ref 0–0.4)
LYMPHOCYTES # BLD AUTO: 1.3 K/MM3 (ref 1–4.8)
LYMPHOCYTES NFR BLD AUTO: 27.9 % (ref 24–44)
MAGNESIUM SERPL-MCNC: 2.2 MG/DL (ref 1.8–2.4)
MCH RBC QN AUTO: 29.1 PG (ref 28–32)
MCHC RBC AUTO-ENTMCNC: 31.8 G/DL (ref 32–36)
MCHC RBC AUTO-ENTMCNC: 91.4 FL (ref 83–99)
MONOCYTES # BLD AUTO: 0.3 K/MM3 (ref 0–0.8)
MONOCYTES NFR BLD AUTO: 7.1 % (ref 0–8)
NEUTROPHILS # BLD AUTO: 2.9 K/MM3 (ref 1.8–7.7)
NEUTROPHILS NFR BLD AUTO: 61.7 % (ref 41–71)
NRBC BLD AUTO-RTO: 0 % (ref 0–0.02)
NRBC BLD AUTO-RTO: 0 % (ref 0–0.2)
PLATELET # BLD AUTO: 136 K/MM3 (ref 150–400)
PMV BLD AUTO: 9.3 FL (ref 9.4–12.4)
POTASSIUM SERPL-SCNC: 4.7 MEQ/L (ref 3.5–5.1)
PROT SERPL-MCNC: 5.8 G/DL (ref 6.4–8.2)
RBC # BLD AUTO: 4.06 M/MM3 (ref 4.52–5.9)
SODIUM SERPL-SCNC: 142 MEQ/L (ref 136–145)
TROPONIN I SERPL HS-IMP: 11 PG/ML (ref ?–76)
WBC # BLD AUTO: 4.76 K/MM3 (ref 3.9–11.3)

## 2024-04-26 RX ADMIN — LORAZEPAM PRN MG: 2 INJECTION INTRAMUSCULAR; INTRAVENOUS at 19:37

## 2024-04-26 RX ADMIN — LORAZEPAM ONE: 2 INJECTION INTRAMUSCULAR; INTRAVENOUS at 20:05

## 2024-05-04 ENCOUNTER — HOSPITAL ENCOUNTER (EMERGENCY)
Dept: HOSPITAL 41 - JD.ED | Age: 79
Discharge: HOME | End: 2024-05-04
Payer: MEDICARE

## 2024-05-04 DIAGNOSIS — G30.9: ICD-10-CM

## 2024-05-04 DIAGNOSIS — Z86.73: ICD-10-CM

## 2024-05-04 DIAGNOSIS — I25.10: ICD-10-CM

## 2024-05-04 DIAGNOSIS — Z88.0: ICD-10-CM

## 2024-05-04 DIAGNOSIS — Z79.82: ICD-10-CM

## 2024-05-04 DIAGNOSIS — Z79.899: ICD-10-CM

## 2024-05-04 DIAGNOSIS — R07.89: Primary | ICD-10-CM

## 2024-05-04 DIAGNOSIS — Z88.8: ICD-10-CM

## 2024-05-04 DIAGNOSIS — Z86.16: ICD-10-CM

## 2024-05-04 DIAGNOSIS — F02.C0: ICD-10-CM

## 2024-05-04 DIAGNOSIS — I25.2: ICD-10-CM

## 2024-05-04 DIAGNOSIS — I10: ICD-10-CM

## 2024-05-04 DIAGNOSIS — N28.9: ICD-10-CM

## 2024-05-04 LAB
ALBUMIN SERPL-MCNC: 3.2 G/DL (ref 3.4–5)
ALBUMIN/GLOB SERPL: 1.1 {RATIO} (ref 1–2)
ALP SERPL-CCNC: 63 U/L (ref 46–116)
ALT SERPL-CCNC: 15 U/L (ref 16–63)
ANION GAP SERPL CALC-SCNC: 16.2 MMOL/L (ref 5–15)
AST SERPL-CCNC: 11 U/L (ref 15–37)
BASOPHILS # BLD AUTO: 0 K/MM3 (ref 0–0.2)
BASOPHILS NFR BLD AUTO: 0.6 % (ref 0–1)
BILIRUB SERPL-MCNC: 0.8 MG/DL (ref 0.2–1)
BUN SERPL-MCNC: 23 MG/DL (ref 7–18)
BUN/CREAT SERPL: 11 (ref 14–18)
CALCIUM SERPL-MCNC: 9.1 MG/DL (ref 8.5–10.1)
CHLORIDE SERPL-SCNC: 105 MEQ/L (ref 98–107)
CO2 SERPL-SCNC: 26 MEQ/L (ref 21–32)
CREAT CL 24H UR+SERPL-VRATE: 28.95 ML/MIN
CREAT SERPL-MCNC: 2.1 MG/DL (ref 0.7–1.3)
EGFRCR SERPLBLD CKD-EPI 2021: 32 ML/MIN (ref 60–?)
EOSINOPHIL # BLD AUTO: 0.1 K/MM3 (ref 0–0.4)
EOSINOPHIL NFR BLD AUTO: 0.8 % (ref 0–6)
ETHANOL BLD-MCNC: 0 GM%
GLOBULIN SER-MCNC: 2.8 GM/DL
GLUCOSE SERPL-MCNC: 157 MG/DL (ref 70–99)
HCT VFR BLD AUTO: 42.8 % (ref 42–52)
HGB BLD-MCNC: 13.8 GM/DL (ref 14–18)
IMM GRANULOCYTES # BLD: 0.02 K/MM3 (ref 0–0.05)
IMM GRANULOCYTES NFR BLD: 0.3 % (ref 0–0.4)
LYMPHOCYTES # BLD AUTO: 0.9 K/MM3 (ref 1–4.8)
LYMPHOCYTES NFR BLD AUTO: 14.8 % (ref 24–44)
MAGNESIUM SERPL-MCNC: 2.1 MG/DL (ref 1.8–2.4)
MCH RBC QN AUTO: 29.4 PG (ref 28–32)
MCHC RBC AUTO-ENTMCNC: 32.2 G/DL (ref 32–36)
MCHC RBC AUTO-ENTMCNC: 91.1 FL (ref 83–99)
MONOCYTES # BLD AUTO: 0.5 K/MM3 (ref 0–0.8)
MONOCYTES NFR BLD AUTO: 7.9 % (ref 0–8)
NEUTROPHILS # BLD AUTO: 4.8 K/MM3 (ref 1.8–7.7)
NEUTROPHILS NFR BLD AUTO: 75.6 % (ref 41–71)
NRBC BLD AUTO-RTO: 0 % (ref 0–0.02)
NRBC BLD AUTO-RTO: 0 % (ref 0–0.2)
PLATELET # BLD AUTO: 131 K/MM3 (ref 150–400)
PMV BLD AUTO: 9 FL (ref 9.4–12.4)
POTASSIUM SERPL-SCNC: 5.2 MEQ/L (ref 3.5–5.1)
PROT SERPL-MCNC: 6 G/DL (ref 6.4–8.2)
RBC # BLD AUTO: 4.7 M/MM3 (ref 4.52–5.9)
SODIUM SERPL-SCNC: 142 MEQ/L (ref 136–145)
TROPONIN I SERPL HS-IMP: 12 PG/ML (ref ?–76)
WBC # BLD AUTO: 6.33 K/MM3 (ref 3.9–11.3)

## 2024-05-04 PROCEDURE — 36415 COLL VENOUS BLD VENIPUNCTURE: CPT

## 2024-05-04 PROCEDURE — 93005 ELECTROCARDIOGRAM TRACING: CPT

## 2024-05-04 PROCEDURE — 80307 DRUG TEST PRSMV CHEM ANLYZR: CPT

## 2024-05-04 PROCEDURE — 80053 COMPREHEN METABOLIC PANEL: CPT

## 2024-05-04 PROCEDURE — 99285 EMERGENCY DEPT VISIT HI MDM: CPT

## 2024-05-04 PROCEDURE — 84484 ASSAY OF TROPONIN QUANT: CPT

## 2024-05-04 PROCEDURE — 85025 COMPLETE CBC W/AUTO DIFF WBC: CPT

## 2024-05-04 PROCEDURE — 83735 ASSAY OF MAGNESIUM: CPT

## 2024-05-04 PROCEDURE — 71045 X-RAY EXAM CHEST 1 VIEW: CPT

## 2024-05-04 RX ADMIN — Medication PRN ML: at 16:39

## 2024-06-01 ENCOUNTER — HOSPITAL ENCOUNTER (EMERGENCY)
Dept: HOSPITAL 41 - JD.ED | Age: 79
Discharge: HOME | End: 2024-06-01
Payer: MEDICARE

## 2024-06-01 DIAGNOSIS — I10: ICD-10-CM

## 2024-06-01 DIAGNOSIS — Z79.82: ICD-10-CM

## 2024-06-01 DIAGNOSIS — E78.00: ICD-10-CM

## 2024-06-01 DIAGNOSIS — I25.10: ICD-10-CM

## 2024-06-01 DIAGNOSIS — N28.9: ICD-10-CM

## 2024-06-01 DIAGNOSIS — G30.9: ICD-10-CM

## 2024-06-01 DIAGNOSIS — F02.80: ICD-10-CM

## 2024-06-01 DIAGNOSIS — J44.9: ICD-10-CM

## 2024-06-01 DIAGNOSIS — Z87.891: ICD-10-CM

## 2024-06-01 DIAGNOSIS — I25.2: ICD-10-CM

## 2024-06-01 DIAGNOSIS — R07.89: Primary | ICD-10-CM

## 2024-06-01 DIAGNOSIS — Z86.16: ICD-10-CM

## 2024-06-01 DIAGNOSIS — Z88.0: ICD-10-CM

## 2024-06-01 DIAGNOSIS — Z79.899: ICD-10-CM

## 2024-06-01 DIAGNOSIS — Z86.73: ICD-10-CM

## 2024-06-01 LAB
ALBUMIN SERPL-MCNC: 3.2 G/DL (ref 3.4–5)
ALBUMIN/GLOB SERPL: 1.1 {RATIO} (ref 1–2)
ALP SERPL-CCNC: 64 U/L (ref 46–116)
ALT SERPL-CCNC: 20 U/L (ref 16–63)
ANION GAP SERPL CALC-SCNC: 14.1 MMOL/L (ref 5–15)
APTT PPP: 26.2 SECONDS (ref 21.7–31.4)
AST SERPL-CCNC: 12 U/L (ref 15–37)
BASOPHILS # BLD AUTO: 0 K/MM3 (ref 0–0.2)
BASOPHILS NFR BLD AUTO: 0.7 % (ref 0–1)
BILIRUB SERPL-MCNC: 0.6 MG/DL (ref 0.2–1)
BUN SERPL-MCNC: 20 MG/DL (ref 7–18)
BUN/CREAT SERPL: 8.3 (ref 14–18)
CALCIUM SERPL-MCNC: 9 MG/DL (ref 8.5–10.1)
CHLORIDE SERPL-SCNC: 105 MEQ/L (ref 98–107)
CO2 SERPL-SCNC: 27 MEQ/L (ref 21–32)
CREAT CL 24H UR+SERPL-VRATE: 28.74 ML/MIN
CREAT SERPL-MCNC: 2.4 MG/DL (ref 0.7–1.3)
CRP SERPL-MCNC: 0.12 MG/DL (ref ?–0.3)
EGFRCR SERPLBLD CKD-EPI 2021: 27 ML/MIN (ref 60–?)
EOSINOPHIL # BLD AUTO: 0.1 K/MM3 (ref 0–0.4)
EOSINOPHIL NFR BLD AUTO: 0.9 % (ref 0–6)
GLOBULIN SER-MCNC: 2.8 GM/DL
GLUCOSE SERPL-MCNC: 146 MG/DL (ref 70–99)
HCT VFR BLD AUTO: 41.9 % (ref 42–52)
HGB BLD-MCNC: 13.7 GM/DL (ref 14–18)
IMM GRANULOCYTES # BLD: 0.01 K/MM3 (ref 0–0.05)
IMM GRANULOCYTES NFR BLD: 0.2 % (ref 0–0.4)
INR PPP: 1.09
LYMPHOCYTES # BLD AUTO: 1.1 K/MM3 (ref 1–4.8)
LYMPHOCYTES NFR BLD AUTO: 19 % (ref 24–44)
MAGNESIUM SERPL-MCNC: 1.8 MG/DL (ref 1.8–2.4)
MCH RBC QN AUTO: 29.4 PG (ref 28–32)
MCHC RBC AUTO-ENTMCNC: 32.7 G/DL (ref 32–36)
MCHC RBC AUTO-ENTMCNC: 89.9 FL (ref 83–99)
MONOCYTES # BLD AUTO: 0.6 K/MM3 (ref 0–0.8)
MONOCYTES NFR BLD AUTO: 9.5 % (ref 0–8)
NEUTROPHILS # BLD AUTO: 4.1 K/MM3 (ref 1.8–7.7)
NEUTROPHILS NFR BLD AUTO: 69.7 % (ref 41–71)
NRBC BLD AUTO-RTO: 0 % (ref 0–0.02)
NRBC BLD AUTO-RTO: 0 % (ref 0–0.2)
PLATELET # BLD AUTO: 137 K/MM3 (ref 150–400)
PMV BLD AUTO: 8.9 FL (ref 9.4–12.4)
POTASSIUM SERPL-SCNC: 4.1 MEQ/L (ref 3.5–5.1)
PROT SERPL-MCNC: 6 G/DL (ref 6.4–8.2)
PROTHROMBIN TIME: 11.6 SECONDS (ref 9.7–12)
RBC # BLD AUTO: 4.66 M/MM3 (ref 4.52–5.9)
SODIUM SERPL-SCNC: 142 MEQ/L (ref 136–145)
TROPONIN I SERPL HS-IMP: 15 PG/ML (ref ?–76)
WBC # BLD AUTO: 5.88 K/MM3 (ref 3.9–11.3)

## 2024-06-01 PROCEDURE — 85730 THROMBOPLASTIN TIME PARTIAL: CPT

## 2024-06-01 PROCEDURE — 85025 COMPLETE CBC W/AUTO DIFF WBC: CPT

## 2024-06-01 PROCEDURE — 71045 X-RAY EXAM CHEST 1 VIEW: CPT

## 2024-06-01 PROCEDURE — 84484 ASSAY OF TROPONIN QUANT: CPT

## 2024-06-01 PROCEDURE — 93005 ELECTROCARDIOGRAM TRACING: CPT

## 2024-06-01 PROCEDURE — 83735 ASSAY OF MAGNESIUM: CPT

## 2024-06-01 PROCEDURE — 36415 COLL VENOUS BLD VENIPUNCTURE: CPT

## 2024-06-01 PROCEDURE — 99285 EMERGENCY DEPT VISIT HI MDM: CPT

## 2024-06-01 PROCEDURE — 83880 ASSAY OF NATRIURETIC PEPTIDE: CPT

## 2024-06-01 PROCEDURE — 86140 C-REACTIVE PROTEIN: CPT

## 2024-06-01 PROCEDURE — 96361 HYDRATE IV INFUSION ADD-ON: CPT

## 2024-06-01 PROCEDURE — 96374 THER/PROPH/DIAG INJ IV PUSH: CPT

## 2024-06-01 PROCEDURE — 85610 PROTHROMBIN TIME: CPT

## 2024-06-01 PROCEDURE — 85379 FIBRIN DEGRADATION QUANT: CPT

## 2024-06-01 PROCEDURE — 80053 COMPREHEN METABOLIC PANEL: CPT

## 2024-06-01 RX ADMIN — FENTANYL CITRATE ONE MCG: 50 INJECTION, SOLUTION INTRAMUSCULAR; INTRAVENOUS at 18:39

## 2024-06-14 ENCOUNTER — HOSPITAL ENCOUNTER (INPATIENT)
Dept: HOSPITAL 41 - JD.ED | Age: 79
LOS: 4 days | Discharge: HOME HEALTH | DRG: 948 | End: 2024-06-18
Attending: STUDENT IN AN ORGANIZED HEALTH CARE EDUCATION/TRAINING PROGRAM
Payer: MEDICARE

## 2024-06-14 DIAGNOSIS — F02.A11: ICD-10-CM

## 2024-06-14 DIAGNOSIS — Z79.82: ICD-10-CM

## 2024-06-14 DIAGNOSIS — M54.9: ICD-10-CM

## 2024-06-14 DIAGNOSIS — Z86.16: ICD-10-CM

## 2024-06-14 DIAGNOSIS — E78.00: ICD-10-CM

## 2024-06-14 DIAGNOSIS — Z79.899: ICD-10-CM

## 2024-06-14 DIAGNOSIS — R62.7: ICD-10-CM

## 2024-06-14 DIAGNOSIS — N40.0: ICD-10-CM

## 2024-06-14 DIAGNOSIS — G89.29: ICD-10-CM

## 2024-06-14 DIAGNOSIS — I25.10: ICD-10-CM

## 2024-06-14 DIAGNOSIS — Z87.81: ICD-10-CM

## 2024-06-14 DIAGNOSIS — E04.1: ICD-10-CM

## 2024-06-14 DIAGNOSIS — R29.6: ICD-10-CM

## 2024-06-14 DIAGNOSIS — I10: ICD-10-CM

## 2024-06-14 DIAGNOSIS — K29.20: ICD-10-CM

## 2024-06-14 DIAGNOSIS — J44.9: ICD-10-CM

## 2024-06-14 DIAGNOSIS — Z66: ICD-10-CM
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
## 2024-06-14 DIAGNOSIS — I12.9: ICD-10-CM

## 2024-06-14 DIAGNOSIS — N17.9: ICD-10-CM

## 2024-06-14 DIAGNOSIS — G30.9: ICD-10-CM

## 2024-06-14 DIAGNOSIS — F10.20: ICD-10-CM

## 2024-06-14 DIAGNOSIS — E86.0: ICD-10-CM

## 2024-06-14 DIAGNOSIS — K21.9: ICD-10-CM

## 2024-06-14 DIAGNOSIS — E87.20: ICD-10-CM

## 2024-06-14 DIAGNOSIS — I25.2: ICD-10-CM

## 2024-06-14 DIAGNOSIS — Z88.0: ICD-10-CM

## 2024-06-14 DIAGNOSIS — S01.412A: ICD-10-CM

## 2024-06-14 DIAGNOSIS — R41.82: Primary | ICD-10-CM

## 2024-06-14 DIAGNOSIS — E88.09: ICD-10-CM

## 2024-06-14 DIAGNOSIS — Z86.73: ICD-10-CM

## 2024-06-14 DIAGNOSIS — G40.909: ICD-10-CM

## 2024-06-14 DIAGNOSIS — E66.9: ICD-10-CM

## 2024-06-14 DIAGNOSIS — N18.32: ICD-10-CM

## 2024-06-14 LAB
ALBUMIN SERPL-MCNC: 2.9 G/DL (ref 3.4–5)
ALBUMIN/GLOB SERPL: 0.9 {RATIO} (ref 1–2)
ALP SERPL-CCNC: 61 U/L (ref 46–116)
ALT SERPL-CCNC: 16 U/L (ref 16–63)
ANION GAP SERPL CALC-SCNC: 17.9 MMOL/L (ref 5–15)
APPEARANCE UR: CLEAR
AST SERPL-CCNC: 11 U/L (ref 15–37)
BACTERIA URNS QL MICRO: (no result) /HPF
BASOPHILS # BLD AUTO: 0 K/MM3 (ref 0–0.2)
BASOPHILS NFR BLD AUTO: 0.4 % (ref 0–1)
BILIRUB SERPL-MCNC: 0.7 MG/DL (ref 0.2–1)
BILIRUB UR STRIP-MCNC: (no result) MG/DL
BUN SERPL-MCNC: 32 MG/DL (ref 7–18)
BUN/CREAT SERPL: 13.9 (ref 14–18)
CALCIUM SERPL-MCNC: 9.2 MG/DL (ref 8.5–10.1)
CHLORIDE SERPL-SCNC: 103 MEQ/L (ref 98–107)
CO2 SERPL-SCNC: 26 MEQ/L (ref 21–32)
COLOR UR: YELLOW
CREAT CL 24H UR+SERPL-VRATE: 29.99 ML/MIN
CREAT SERPL-MCNC: 2.3 MG/DL (ref 0.7–1.3)
CRP SERPL-MCNC: 0.77 MG/DL (ref ?–0.3)
EGFRCR SERPLBLD CKD-EPI 2021: 29 ML/MIN (ref 60–?)
EOSINOPHIL # BLD AUTO: 0 K/MM3 (ref 0–0.4)
EOSINOPHIL NFR BLD AUTO: 0.1 % (ref 0–6)
GLOBULIN SER-MCNC: 3.3 GM/DL
GLUCOSE SERPL-MCNC: 127 MG/DL (ref 70–99)
GLUCOSE UR STRIP-MCNC: NEGATIVE MG/DL
HBA1C BLD-MCNC: 5.2 %
HCT VFR BLD AUTO: 41.1 % (ref 42–52)
HGB BLD-MCNC: 13.6 GM/DL (ref 14–18)
IMM GRANULOCYTES # BLD: 0.03 K/MM3 (ref 0–0.05)
IMM GRANULOCYTES NFR BLD: 0.3 % (ref 0–0.4)
KETONES UR STRIP-MCNC: (no result) MG/DL
LACTATE SERPL-SCNC: 4.8 MMOL/L (ref 0.4–2)
LYMPHOCYTES # BLD AUTO: 0.8 K/MM3 (ref 1–4.8)
LYMPHOCYTES NFR BLD AUTO: 9 % (ref 24–44)
MAGNESIUM SERPL-MCNC: 1.8 MG/DL (ref 1.8–2.4)
MCH RBC QN AUTO: 29.4 PG (ref 28–32)
MCHC RBC AUTO-ENTMCNC: 33.1 G/DL (ref 32–36)
MCHC RBC AUTO-ENTMCNC: 89 FL (ref 83–99)
MONOCYTES # BLD AUTO: 0.8 K/MM3 (ref 0–0.8)
MONOCYTES NFR BLD AUTO: 9.1 % (ref 0–8)
MUCOUS THREADS URNS QL MICRO: (no result) /HPF
NEUTROPHILS # BLD AUTO: 7.3 K/MM3 (ref 1.8–7.7)
NEUTROPHILS NFR BLD AUTO: 81.1 % (ref 41–71)
NITRITE UR QL: NEGATIVE
NRBC BLD AUTO-RTO: 0 % (ref 0–0.02)
NRBC BLD AUTO-RTO: 0 % (ref 0–0.2)
PH UR STRIP: 5.5 [PH] (ref 5–8)
PLATELET # BLD AUTO: 114 K/MM3 (ref 150–400)
PMV BLD AUTO: 9.4 FL (ref 9.4–12.4)
POTASSIUM SERPL-SCNC: 4.9 MEQ/L (ref 3.5–5.1)
PROT SERPL-MCNC: 6.2 G/DL (ref 6.4–8.2)
PROT UR STRIP-MCNC: (no result) MG/DL
RBC # BLD AUTO: 4.62 M/MM3 (ref 4.52–5.9)
RBC # URNS HPF: (no result) /HPF (ref 0–5)
RBC UR QL: (no result)
SODIUM SERPL-SCNC: 142 MEQ/L (ref 136–145)
SP GR UR STRIP: 1.02 (ref 1–1.03)
SQUAMOUS #/AREA URNS HPF: (no result) /HPF (ref 0–5)
TROPONIN I SERPL HS-IMP: 18 PG/ML (ref ?–76)
UROBILINOGEN UR STRIP-ACNC: 1 (ref 0.2–1)
WBC # BLD AUTO: 9.05 K/MM3 (ref 3.9–11.3)
WBC UR QL: (no result) /HPF (ref 0–5)

## 2024-06-14 PROCEDURE — 84484 ASSAY OF TROPONIN QUANT: CPT

## 2024-06-14 PROCEDURE — 80053 COMPREHEN METABOLIC PANEL: CPT

## 2024-06-14 PROCEDURE — 90715 TDAP VACCINE 7 YRS/> IM: CPT

## 2024-06-14 PROCEDURE — 86140 C-REACTIVE PROTEIN: CPT

## 2024-06-14 PROCEDURE — 72125 CT NECK SPINE W/O DYE: CPT

## 2024-06-14 PROCEDURE — 93005 ELECTROCARDIOGRAM TRACING: CPT

## 2024-06-14 PROCEDURE — 83880 ASSAY OF NATRIURETIC PEPTIDE: CPT

## 2024-06-14 PROCEDURE — 73200 CT UPPER EXTREMITY W/O DYE: CPT

## 2024-06-14 PROCEDURE — 36415 COLL VENOUS BLD VENIPUNCTURE: CPT

## 2024-06-14 PROCEDURE — 70450 CT HEAD/BRAIN W/O DYE: CPT

## 2024-06-14 PROCEDURE — 83036 HEMOGLOBIN GLYCOSYLATED A1C: CPT

## 2024-06-14 PROCEDURE — 83605 ASSAY OF LACTIC ACID: CPT

## 2024-06-14 PROCEDURE — 83735 ASSAY OF MAGNESIUM: CPT

## 2024-06-14 PROCEDURE — 71045 X-RAY EXAM CHEST 1 VIEW: CPT

## 2024-06-14 PROCEDURE — 85025 COMPLETE CBC W/AUTO DIFF WBC: CPT

## 2024-06-14 PROCEDURE — 80164 ASSAY DIPROPYLACETIC ACD TOT: CPT

## 2024-06-14 PROCEDURE — 73030 X-RAY EXAM OF SHOULDER: CPT

## 2024-06-14 RX ADMIN — TETANUS TOXOID, REDUCED DIPHTHERIA TOXOID AND ACELLULAR PERTUSSIS VACCINE, ADSORBED ONE ML: 5; 2.5; 8; 8; 2.5 SUSPENSION INTRAMUSCULAR at 06:07

## 2024-06-14 RX ADMIN — FENTANYL CITRATE ONE MCG: 50 INJECTION, SOLUTION INTRAMUSCULAR; INTRAVENOUS at 05:56

## 2024-06-14 RX ADMIN — HEPARIN SODIUM SCH UNITS: 5000 INJECTION, SOLUTION INTRAVENOUS; SUBCUTANEOUS at 09:57

## 2024-06-15 LAB
ALBUMIN SERPL-MCNC: 2.9 G/DL (ref 3.4–5)
ALBUMIN/GLOB SERPL: 1 {RATIO} (ref 1–2)
ALP SERPL-CCNC: 51 U/L (ref 46–116)
ALT SERPL-CCNC: 13 U/L (ref 16–63)
ANION GAP SERPL CALC-SCNC: 10.9 MMOL/L (ref 5–15)
AST SERPL-CCNC: 9 U/L (ref 15–37)
BASOPHILS # BLD AUTO: 0 K/MM3 (ref 0–0.2)
BASOPHILS NFR BLD AUTO: 0.5 % (ref 0–1)
BILIRUB SERPL-MCNC: 0.5 MG/DL (ref 0.2–1)
BUN SERPL-MCNC: 24 MG/DL (ref 7–18)
BUN/CREAT SERPL: 14.1 (ref 14–18)
CALCIUM SERPL-MCNC: 8.5 MG/DL (ref 8.5–10.1)
CHLORIDE SERPL-SCNC: 107 MEQ/L (ref 98–107)
CO2 SERPL-SCNC: 28 MEQ/L (ref 21–32)
CREAT CL 24H UR+SERPL-VRATE: 40.58 ML/MIN
CREAT SERPL-MCNC: 1.7 MG/DL (ref 0.7–1.3)
EGFRCR SERPLBLD CKD-EPI 2021: 41 ML/MIN (ref 60–?)
EOSINOPHIL # BLD AUTO: 0.1 K/MM3 (ref 0–0.4)
EOSINOPHIL NFR BLD AUTO: 1.5 % (ref 0–6)
GLOBULIN SER-MCNC: 2.8 GM/DL
GLUCOSE SERPL-MCNC: 101 MG/DL (ref 70–99)
HCT VFR BLD AUTO: 36.2 % (ref 42–52)
HGB BLD-MCNC: 11.9 GM/DL (ref 14–18)
IMM GRANULOCYTES # BLD: 0.01 K/MM3 (ref 0–0.05)
IMM GRANULOCYTES NFR BLD: 0.3 % (ref 0–0.4)
LACTATE SERPL-SCNC: 1.8 MMOL/L (ref 0.4–2)
LYMPHOCYTES # BLD AUTO: 1 K/MM3 (ref 1–4.8)
LYMPHOCYTES NFR BLD AUTO: 24.6 % (ref 24–44)
MAGNESIUM SERPL-MCNC: 2 MG/DL (ref 1.8–2.4)
MCH RBC QN AUTO: 29.6 PG (ref 28–32)
MCHC RBC AUTO-ENTMCNC: 32.9 G/DL (ref 32–36)
MCHC RBC AUTO-ENTMCNC: 90 FL (ref 83–99)
MONOCYTES # BLD AUTO: 0.4 K/MM3 (ref 0–0.8)
MONOCYTES NFR BLD AUTO: 10.1 % (ref 0–8)
NEUTROPHILS # BLD AUTO: 2.5 K/MM3 (ref 1.8–7.7)
NEUTROPHILS NFR BLD AUTO: 63 % (ref 41–71)
NRBC BLD AUTO-RTO: 0 % (ref 0–0.02)
NRBC BLD AUTO-RTO: 0 % (ref 0–0.2)
PATH REV BLD -IMP: (no result)
PLATELET # BLD AUTO: 80 K/MM3 (ref 150–400)
PMV BLD AUTO: 9.2 FL (ref 9.4–12.4)
POTASSIUM SERPL-SCNC: 3.9 MEQ/L (ref 3.5–5.1)
PROT SERPL-MCNC: 5.7 G/DL (ref 6.4–8.2)
RBC # BLD AUTO: 4.02 M/MM3 (ref 4.52–5.9)
SODIUM SERPL-SCNC: 142 MEQ/L (ref 136–145)
WBC # BLD AUTO: 3.98 K/MM3 (ref 3.9–11.3)

## 2024-06-15 RX ADMIN — Medication SCH: at 17:28

## 2024-06-16 LAB
ALBUMIN SERPL-MCNC: 2.8 G/DL (ref 3.4–5)
ALBUMIN/GLOB SERPL: 1 {RATIO} (ref 1–2)
ALP SERPL-CCNC: 53 U/L (ref 46–116)
ALT SERPL-CCNC: 13 U/L (ref 16–63)
ANION GAP SERPL CALC-SCNC: 11.2 MMOL/L (ref 5–15)
AST SERPL-CCNC: 7 U/L (ref 15–37)
BASOPHILS # BLD AUTO: 0 K/MM3 (ref 0–0.2)
BASOPHILS NFR BLD AUTO: 0.7 % (ref 0–1)
BILIRUB SERPL-MCNC: 0.6 MG/DL (ref 0.2–1)
BUN SERPL-MCNC: 14 MG/DL (ref 7–18)
BUN/CREAT SERPL: 10.8 (ref 14–18)
CALCIUM SERPL-MCNC: 8.4 MG/DL (ref 8.5–10.1)
CHLORIDE SERPL-SCNC: 110 MEQ/L (ref 98–107)
CO2 SERPL-SCNC: 28 MEQ/L (ref 21–32)
CREAT CL 24H UR+SERPL-VRATE: 53.06 ML/MIN
CREAT SERPL-MCNC: 1.3 MG/DL (ref 0.7–1.3)
EGFRCR SERPLBLD CKD-EPI 2021: 57 ML/MIN (ref 60–?)
EOSINOPHIL # BLD AUTO: 0.1 K/MM3 (ref 0–0.4)
EOSINOPHIL NFR BLD AUTO: 2 % (ref 0–6)
GLOBULIN SER-MCNC: 2.8 GM/DL
GLUCOSE SERPL-MCNC: 91 MG/DL (ref 70–99)
HCT VFR BLD AUTO: 35.6 % (ref 42–52)
HGB BLD-MCNC: 11.7 GM/DL (ref 14–18)
IMM GRANULOCYTES # BLD: 0.01 K/MM3 (ref 0–0.05)
IMM GRANULOCYTES NFR BLD: 0.2 % (ref 0–0.4)
LYMPHOCYTES # BLD AUTO: 1 K/MM3 (ref 1–4.8)
LYMPHOCYTES NFR BLD AUTO: 25.6 % (ref 24–44)
MCH RBC QN AUTO: 29.5 PG (ref 28–32)
MCHC RBC AUTO-ENTMCNC: 32.9 G/DL (ref 32–36)
MCHC RBC AUTO-ENTMCNC: 89.7 FL (ref 83–99)
MONOCYTES # BLD AUTO: 0.4 K/MM3 (ref 0–0.8)
MONOCYTES NFR BLD AUTO: 10.4 % (ref 0–8)
NEUTROPHILS # BLD AUTO: 2.5 K/MM3 (ref 1.8–7.7)
NEUTROPHILS NFR BLD AUTO: 61.1 % (ref 41–71)
NRBC BLD AUTO-RTO: 0 % (ref 0–0.02)
NRBC BLD AUTO-RTO: 0 % (ref 0–0.2)
PLATELET # BLD AUTO: 89 K/MM3 (ref 150–400)
PMV BLD AUTO: 9.2 FL (ref 9.4–12.4)
POTASSIUM SERPL-SCNC: 4.2 MEQ/L (ref 3.5–5.1)
PROT SERPL-MCNC: 5.6 G/DL (ref 6.4–8.2)
RBC # BLD AUTO: 3.97 M/MM3 (ref 4.52–5.9)
SODIUM SERPL-SCNC: 145 MEQ/L (ref 136–145)
WBC # BLD AUTO: 4.03 K/MM3 (ref 3.9–11.3)

## 2024-06-17 LAB
ANION GAP SERPL CALC-SCNC: 11.9 MMOL/L (ref 5–15)
BASOPHILS # BLD AUTO: 0 K/MM3 (ref 0–0.2)
BASOPHILS NFR BLD AUTO: 0.7 % (ref 0–1)
BUN SERPL-MCNC: 19 MG/DL (ref 7–18)
BUN/CREAT SERPL: 12.7 (ref 14–18)
CALCIUM SERPL-MCNC: 8.4 MG/DL (ref 8.5–10.1)
CHLORIDE SERPL-SCNC: 104 MEQ/L (ref 98–107)
CO2 SERPL-SCNC: 28 MEQ/L (ref 21–32)
CREAT CL 24H UR+SERPL-VRATE: 45.99 ML/MIN
CREAT SERPL-MCNC: 1.5 MG/DL (ref 0.7–1.3)
EGFRCR SERPLBLD CKD-EPI 2021: 48 ML/MIN (ref 60–?)
EOSINOPHIL # BLD AUTO: 0.1 K/MM3 (ref 0–0.4)
EOSINOPHIL NFR BLD AUTO: 3.2 % (ref 0–6)
GLUCOSE SERPL-MCNC: 95 MG/DL (ref 70–99)
HCT VFR BLD AUTO: 35.8 % (ref 42–52)
HGB BLD-MCNC: 11.7 GM/DL (ref 14–18)
IMM GRANULOCYTES # BLD: 0.01 K/MM3 (ref 0–0.05)
IMM GRANULOCYTES NFR BLD: 0.2 % (ref 0–0.4)
LYMPHOCYTES # BLD AUTO: 1.3 K/MM3 (ref 1–4.8)
LYMPHOCYTES NFR BLD AUTO: 31.4 % (ref 24–44)
MCH RBC QN AUTO: 29.5 PG (ref 28–32)
MCHC RBC AUTO-ENTMCNC: 32.7 G/DL (ref 32–36)
MCHC RBC AUTO-ENTMCNC: 90.2 FL (ref 83–99)
MONOCYTES # BLD AUTO: 0.5 K/MM3 (ref 0–0.8)
MONOCYTES NFR BLD AUTO: 12.2 % (ref 0–8)
NEUTROPHILS # BLD AUTO: 2.2 K/MM3 (ref 1.8–7.7)
NEUTROPHILS NFR BLD AUTO: 52.3 % (ref 41–71)
NRBC BLD AUTO-RTO: 0 % (ref 0–0.02)
NRBC BLD AUTO-RTO: 0 % (ref 0–0.2)
PLATELET # BLD AUTO: 109 K/MM3 (ref 150–400)
PMV BLD AUTO: 9.1 FL (ref 9.4–12.4)
POTASSIUM SERPL-SCNC: 3.9 MEQ/L (ref 3.5–5.1)
RBC # BLD AUTO: 3.97 M/MM3 (ref 4.52–5.9)
SODIUM SERPL-SCNC: 140 MEQ/L (ref 136–145)
TSH SERPL DL<=0.005 MIU/L-ACNC: 1.94 UIU/ML (ref 0.36–3.74)
WBC # BLD AUTO: 4.11 K/MM3 (ref 3.9–11.3)

## 2024-06-21 LAB — PATH REV BLD -IMP: (no result)

## 2024-07-19 ENCOUNTER — HOSPITAL ENCOUNTER (EMERGENCY)
Dept: HOSPITAL 41 - JD.ED | Age: 79
Discharge: NURSING, REHAB OR LONG-TERM CARE | End: 2024-07-19
Payer: MEDICARE

## 2024-07-19 DIAGNOSIS — I25.10: ICD-10-CM

## 2024-07-19 DIAGNOSIS — G40.909: ICD-10-CM

## 2024-07-19 DIAGNOSIS — Z79.82: ICD-10-CM

## 2024-07-19 DIAGNOSIS — Z88.0: ICD-10-CM

## 2024-07-19 DIAGNOSIS — E78.00: ICD-10-CM

## 2024-07-19 DIAGNOSIS — E66.9: ICD-10-CM

## 2024-07-19 DIAGNOSIS — J44.9: ICD-10-CM

## 2024-07-19 DIAGNOSIS — G30.9: ICD-10-CM

## 2024-07-19 DIAGNOSIS — Z86.16: ICD-10-CM

## 2024-07-19 DIAGNOSIS — I21.4: Primary | ICD-10-CM

## 2024-07-19 DIAGNOSIS — I10: ICD-10-CM

## 2024-07-19 DIAGNOSIS — I25.2: ICD-10-CM

## 2024-07-19 DIAGNOSIS — F02.80: ICD-10-CM

## 2024-07-19 DIAGNOSIS — Z79.899: ICD-10-CM

## 2024-07-19 DIAGNOSIS — N28.9: ICD-10-CM

## 2024-07-19 LAB
ALBUMIN SERPL-MCNC: 2.9 G/DL (ref 3.4–5)
ALBUMIN/GLOB SERPL: 1 {RATIO} (ref 1–2)
ALP SERPL-CCNC: 80 U/L (ref 46–116)
ALT SERPL-CCNC: 14 U/L (ref 16–63)
ANION GAP SERPL CALC-SCNC: 16.1 MMOL/L (ref 5–15)
APPEARANCE UR: CLEAR
AST SERPL-CCNC: 10 U/L (ref 15–37)
BACTERIA URNS QL MICRO: (no result) /HPF
BASOPHILS # BLD AUTO: 0.1 K/MM3 (ref 0–0.2)
BASOPHILS NFR BLD AUTO: 0.7 % (ref 0–1)
BILIRUB SERPL-MCNC: 1.1 MG/DL (ref 0.2–1)
BILIRUB UR STRIP-MCNC: (no result) MG/DL
BUN SERPL-MCNC: 18 MG/DL (ref 7–18)
BUN/CREAT SERPL: 9.5 (ref 14–18)
CALCIUM SERPL-MCNC: 9.2 MG/DL (ref 8.5–10.1)
CHLORIDE SERPL-SCNC: 107 MEQ/L (ref 98–107)
CO2 SERPL-SCNC: 25 MEQ/L (ref 21–32)
COLOR UR: (no result)
CREAT CL 24H UR+SERPL-VRATE: 36.3 ML/MIN
CREAT SERPL-MCNC: 1.9 MG/DL (ref 0.7–1.3)
EGFRCR SERPLBLD CKD-EPI 2021: 36 ML/MIN (ref 60–?)
EOSINOPHIL # BLD AUTO: 0 K/MM3 (ref 0–0.4)
EOSINOPHIL NFR BLD AUTO: 0.4 % (ref 0–6)
GLOBULIN SER-MCNC: 2.8 GM/DL
GLUCOSE SERPL-MCNC: 117 MG/DL (ref 70–99)
GLUCOSE UR STRIP-MCNC: NEGATIVE MG/DL
HCT VFR BLD AUTO: 39.1 % (ref 42–52)
HGB BLD-MCNC: 12.6 GM/DL (ref 14–18)
IMM GRANULOCYTES # BLD: 0.03 K/MM3 (ref 0–0.05)
IMM GRANULOCYTES NFR BLD: 0.4 % (ref 0–0.4)
KETONES UR STRIP-MCNC: (no result) MG/DL
LYMPHOCYTES # BLD AUTO: 0.9 K/MM3 (ref 1–4.8)
LYMPHOCYTES NFR BLD AUTO: 10.4 % (ref 24–44)
MAGNESIUM SERPL-MCNC: 1.8 MG/DL (ref 1.8–2.4)
MCH RBC QN AUTO: 29 PG (ref 28–32)
MCHC RBC AUTO-ENTMCNC: 32.2 G/DL (ref 32–36)
MCHC RBC AUTO-ENTMCNC: 90.1 FL (ref 83–99)
MONOCYTES # BLD AUTO: 0.4 K/MM3 (ref 0–0.8)
MONOCYTES NFR BLD AUTO: 5.4 % (ref 0–8)
MUCOUS THREADS URNS QL MICRO: (no result) /HPF
NEUTROPHILS # BLD AUTO: 6.8 K/MM3 (ref 1.8–7.7)
NEUTROPHILS NFR BLD AUTO: 82.7 % (ref 41–71)
NITRITE UR QL: NEGATIVE
NRBC BLD AUTO-RTO: 0 % (ref 0–0.02)
NRBC BLD AUTO-RTO: 0 % (ref 0–0.2)
PH UR STRIP: 6 [PH] (ref 5–8)
PLATELET # BLD AUTO: 120 K/MM3 (ref 150–400)
PMV BLD AUTO: 9.1 FL (ref 9.4–12.4)
POTASSIUM SERPL-SCNC: 4.1 MEQ/L (ref 3.5–5.1)
PROT SERPL-MCNC: 5.7 G/DL (ref 6.4–8.2)
PROT UR STRIP-MCNC: (no result) MG/DL
RBC # BLD AUTO: 4.34 M/MM3 (ref 4.52–5.9)
RBC # URNS HPF: (no result) /HPF (ref 0–5)
RBC UR QL: (no result)
SODIUM SERPL-SCNC: 144 MEQ/L (ref 136–145)
SP GR UR STRIP: 1.02 (ref 1–1.03)
SQUAMOUS #/AREA URNS HPF: (no result) /HPF (ref 0–5)
TROPONIN I SERPL HS-IMP: 66 PG/ML (ref ?–76)
UROBILINOGEN UR STRIP-ACNC: 2 (ref 0.2–1)
WBC # BLD AUTO: 8.16 K/MM3 (ref 3.9–11.3)
WBC UR QL: (no result) /HPF (ref 0–5)

## 2024-07-19 PROCEDURE — 99285 EMERGENCY DEPT VISIT HI MDM: CPT

## 2024-07-19 PROCEDURE — 72125 CT NECK SPINE W/O DYE: CPT

## 2024-07-19 PROCEDURE — 85730 THROMBOPLASTIN TIME PARTIAL: CPT

## 2024-07-19 PROCEDURE — 71046 X-RAY EXAM CHEST 2 VIEWS: CPT

## 2024-07-19 PROCEDURE — 96365 THER/PROPH/DIAG IV INF INIT: CPT

## 2024-07-19 PROCEDURE — 80053 COMPREHEN METABOLIC PANEL: CPT

## 2024-07-19 PROCEDURE — 83735 ASSAY OF MAGNESIUM: CPT

## 2024-07-19 PROCEDURE — 84484 ASSAY OF TROPONIN QUANT: CPT

## 2024-07-19 PROCEDURE — 36415 COLL VENOUS BLD VENIPUNCTURE: CPT

## 2024-07-19 PROCEDURE — 85025 COMPLETE CBC W/AUTO DIFF WBC: CPT

## 2024-07-19 PROCEDURE — 81001 URINALYSIS AUTO W/SCOPE: CPT

## 2024-07-19 PROCEDURE — 93005 ELECTROCARDIOGRAM TRACING: CPT

## 2024-07-19 RX ADMIN — HEPARIN SODIUM ONE UNITS: 5000 INJECTION, SOLUTION INTRAVENOUS; SUBCUTANEOUS at 20:39

## 2024-07-19 RX ADMIN — NITROGLYCERIN PRN MG: 0.4 TABLET SUBLINGUAL at 20:43

## 2024-07-19 RX ADMIN — Medication PRN ML: at 20:44

## 2024-08-24 ENCOUNTER — HOSPITAL ENCOUNTER (EMERGENCY)
Dept: HOSPITAL 41 - JD.ED | Age: 79
Discharge: HOME | End: 2024-08-24
Payer: COMMERCIAL

## 2024-08-24 DIAGNOSIS — I50.9: ICD-10-CM

## 2024-08-24 DIAGNOSIS — E78.00: ICD-10-CM

## 2024-08-24 DIAGNOSIS — Z88.0: ICD-10-CM

## 2024-08-24 DIAGNOSIS — R60.9: ICD-10-CM

## 2024-08-24 DIAGNOSIS — I25.2: ICD-10-CM

## 2024-08-24 DIAGNOSIS — Z86.16: ICD-10-CM

## 2024-08-24 DIAGNOSIS — E66.9: ICD-10-CM

## 2024-08-24 DIAGNOSIS — F17.220: ICD-10-CM

## 2024-08-24 DIAGNOSIS — Z79.899: ICD-10-CM

## 2024-08-24 DIAGNOSIS — I11.0: ICD-10-CM

## 2024-08-24 DIAGNOSIS — G40.909: Primary | ICD-10-CM

## 2024-08-24 LAB
ALBUMIN SERPL-MCNC: 3 G/DL (ref 3.4–5)
ALBUMIN/GLOB SERPL: 1.1 {RATIO} (ref 1–2)
ALP SERPL-CCNC: 61 U/L (ref 46–116)
ALT SERPL-CCNC: 15 U/L (ref 16–63)
ANION GAP SERPL CALC-SCNC: 12.7 MMOL/L (ref 5–15)
AST SERPL-CCNC: 16 U/L (ref 15–37)
BASOPHILS # BLD AUTO: 0 K/MM3 (ref 0–0.2)
BASOPHILS NFR BLD AUTO: 0.8 % (ref 0–1)
BILIRUB SERPL-MCNC: 0.9 MG/DL (ref 0.2–1)
BUN SERPL-MCNC: 22 MG/DL (ref 7–18)
BUN/CREAT SERPL: 11 (ref 14–18)
CALCIUM SERPL-MCNC: 8.5 MG/DL (ref 8.5–10.1)
CHLORIDE SERPL-SCNC: 104 MEQ/L (ref 98–107)
CO2 SERPL-SCNC: 25 MEQ/L (ref 21–32)
CREAT CL 24H UR+SERPL-VRATE: 34.49 ML/MIN
CREAT SERPL-MCNC: 2 MG/DL (ref 0.7–1.3)
CRP SERPL-MCNC: 0.64 MG/DL (ref ?–0.3)
EGFRCR SERPLBLD CKD-EPI 2021: 34 ML/MIN (ref 60–?)
EOSINOPHIL # BLD AUTO: 0.1 K/MM3 (ref 0–0.4)
EOSINOPHIL NFR BLD AUTO: 1.4 % (ref 0–6)
GLOBULIN SER-MCNC: 2.8 GM/DL
GLUCOSE SERPL-MCNC: 158 MG/DL (ref 70–99)
HCT VFR BLD AUTO: 37 % (ref 42–52)
HGB BLD-MCNC: 11.9 GM/DL (ref 14–18)
IMM GRANULOCYTES # BLD: 0.02 K/MM3 (ref 0–0.05)
IMM GRANULOCYTES NFR BLD: 0.6 % (ref 0–0.4)
LACTATE SERPL-SCNC: 3.6 MMOL/L (ref 0.4–2)
LYMPHOCYTES # BLD AUTO: 0.8 K/MM3 (ref 1–4.8)
LYMPHOCYTES NFR BLD AUTO: 21.2 % (ref 24–44)
MAGNESIUM SERPL-MCNC: 1.6 MG/DL (ref 1.8–2.4)
MCH RBC QN AUTO: 28.9 PG (ref 28–32)
MCHC RBC AUTO-ENTMCNC: 32.2 G/DL (ref 32–36)
MCHC RBC AUTO-ENTMCNC: 89.8 FL (ref 83–99)
MONOCYTES # BLD AUTO: 0.5 K/MM3 (ref 0–0.8)
MONOCYTES NFR BLD AUTO: 13 % (ref 0–8)
NEUTROPHILS # BLD AUTO: 2.2 K/MM3 (ref 1.8–7.7)
NEUTROPHILS NFR BLD AUTO: 63 % (ref 41–71)
NRBC BLD AUTO-RTO: 0 % (ref 0–0.02)
NRBC BLD AUTO-RTO: 0 % (ref 0–0.2)
PLATELET # BLD AUTO: 113 K/MM3 (ref 150–400)
PMV BLD AUTO: 9.1 FL (ref 9.4–12.4)
POTASSIUM SERPL-SCNC: 3.7 MEQ/L (ref 3.5–5.1)
PROT SERPL-MCNC: 5.8 G/DL (ref 6.4–8.2)
RBC # BLD AUTO: 4.12 M/MM3 (ref 4.52–5.9)
SODIUM SERPL-SCNC: 138 MEQ/L (ref 136–145)
TROPONIN I SERPL HS-IMP: 14 PG/ML (ref ?–76)
VALPROATE SERPL-MCNC: 79 UG/ML (ref 50–100)
WBC # BLD AUTO: 3.54 K/MM3 (ref 3.9–11.3)

## 2024-08-24 RX ADMIN — SODIUM CHLORIDE ONE MG: 9 INJECTION, SOLUTION INTRAVENOUS at 17:38

## 2024-08-24 RX ADMIN — DIVALPROEX SODIUM ONE MG: 250 TABLET, DELAYED RELEASE ORAL at 20:00
